# Patient Record
Sex: FEMALE | Race: WHITE | Employment: FULL TIME | URBAN - METROPOLITAN AREA
[De-identification: names, ages, dates, MRNs, and addresses within clinical notes are randomized per-mention and may not be internally consistent; named-entity substitution may affect disease eponyms.]

---

## 2019-05-17 ENCOUNTER — OFFICE VISIT (OUTPATIENT)
Dept: URGENT CARE | Facility: URGENT CARE | Age: 34
End: 2019-05-17
Payer: COMMERCIAL

## 2019-05-17 VITALS
HEIGHT: 67 IN | DIASTOLIC BLOOD PRESSURE: 84 MMHG | SYSTOLIC BLOOD PRESSURE: 120 MMHG | BODY MASS INDEX: 26.84 KG/M2 | RESPIRATION RATE: 20 BRPM | OXYGEN SATURATION: 99 % | WEIGHT: 171 LBS | HEART RATE: 88 BPM

## 2019-05-17 DIAGNOSIS — T78.2XXA ANAPHYLACTIC REACTION, INITIAL ENCOUNTER: Primary | ICD-10-CM

## 2019-05-17 PROCEDURE — 99203 OFFICE O/P NEW LOW 30 MIN: CPT | Performed by: INTERNAL MEDICINE

## 2019-05-17 RX ORDER — PREDNISONE 20 MG/1
TABLET ORAL
Qty: 21 TABLET | Refills: 0 | Status: SHIPPED | OUTPATIENT
Start: 2019-05-17

## 2019-05-17 RX ORDER — EPINEPHRINE 0.3 MG/.3ML
0.3 INJECTION SUBCUTANEOUS PRN
Qty: 1 EACH | Refills: 0 | Status: SHIPPED | OUTPATIENT
Start: 2019-05-17

## 2019-05-17 RX ORDER — KETOROLAC TROMETHAMINE 10 MG/1
10 TABLET, FILM COATED ORAL EVERY 6 HOURS PRN
COMMUNITY

## 2019-05-17 ASSESSMENT — MIFFLIN-ST. JEOR: SCORE: 1500.34

## 2019-05-17 NOTE — PROGRESS NOTES
"SUBJECTIVE:  Katt Bryan, a 33 year old female, presents for evaluation of`   Traveling from [place]. She was staying at hotel and asked for linens without feathers.  She then entered the room and developed symptoms of sweating, flushing, increased heart rate, lightheadedness.  She administered her Epipen which helped the anaphylactoid symptoms.  The symptoms occurred 18 hours ago. She now has ongoing congestion in the chest. With associated rhinorrhea and post nasal drip and itchy eyes.  She started cetirizine in addition to her epinephrine.      PMH: she has seen an allergist/immunologist in [place] and diagnosed with severe allergies by skin test; also sees a dermatologist for extensive allergic contact dermatitis    ROS:  The following systems have been completely reviewed and are negative except as noted in the HPI: CONSTITUTIONAL, HEAD AND NECK, CARDIOVASCULAR, PULMONARY and GASTROINTESTINAL    OBJECTIVE:  /84 (Cuff Size: Adult Regular)   Pulse 88   Resp 20   Ht 1.689 m (5' 6.5\")   Wt 77.6 kg (171 lb)   SpO2 99%   BMI 27.19 kg/m    GENERAL: healthy, alert and no distress  HENT: ear canals and TM's normal and nose and mouth without ulcers or lesions  NECK: no adenopathy, no asymmetry, masses, or scars and thyroid normal to palpation  RESP: bilateral end-expiratory wheeze; comfortable respiratory mechanics; no rales  CV: regular rates and rhythm, normal S1 S2, no S3 or S4 and no murmur, click or rub -  EXT: no cyanosis, clubbing or edema; peripheral pulses are brisk and symmetric in the radials, dorsalis pedis and posterior tibials bilaterally  SKIN: no suspicious lesions or rashes     ASSESSMENT/PLAN:    ICD-10-CM    1. Anaphylactic reaction, initial encounter T78.2XXA predniSONE (DELTASONE) 20 MG tablet     EPINEPHrine (EPIPEN/ADRENACLICK/OR ANY BX GENERIC EQUIV) 0.3 MG/0.3ML injection 2-pack       Sandro Price MD   "